# Patient Record
Sex: FEMALE | Race: OTHER | Employment: UNEMPLOYED | URBAN - METROPOLITAN AREA
[De-identification: names, ages, dates, MRNs, and addresses within clinical notes are randomized per-mention and may not be internally consistent; named-entity substitution may affect disease eponyms.]

---
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
## 2020-02-25 NOTE — ED NOTES
Pelvic exam done by Dr Violet Melchor with this RN at bedside. Swabs sent to lab with stickers.      Mariel Antony RN  07/15/18 [address] [facility]  07/15/18 1012 2

## 2020-03-19 ENCOUNTER — HOSPITAL ENCOUNTER (EMERGENCY)
Age: 20
Discharge: HOME | End: 2020-03-19
Payer: MEDICAID

## 2020-03-19 VITALS
RESPIRATION RATE: 16 BRPM | OXYGEN SATURATION: 99 % | HEART RATE: 84 BPM | TEMPERATURE: 98.1 F | DIASTOLIC BLOOD PRESSURE: 65 MMHG | WEIGHT: 131 LBS | SYSTOLIC BLOOD PRESSURE: 109 MMHG | HEIGHT: 62 IN | BODY MASS INDEX: 24.11 KG/M2

## 2020-03-19 LAB
BACTERIA: ABNORMAL /HPF
BILIRUBIN URINE: NEGATIVE
BLOOD, URINE: NEGATIVE
CLARITY: ABNORMAL
COLOR: YELLOW
EPITHELIAL CELLS, UA: ABNORMAL /HPF
GLUCOSE URINE: NEGATIVE MG/DL
KETONES, URINE: ABNORMAL MG/DL
LEUKOCYTE ESTERASE, URINE: ABNORMAL
MUCUS: PRESENT /LPF
NITRITE, URINE: NEGATIVE
PH UA: 6 (ref 5–9)
PROTEIN UA: NEGATIVE MG/DL
SPECIFIC GRAVITY UA: 1.03 (ref 1–1.03)
URINE REFLEX TO CULTURE: YES
UROBILINOGEN, URINE: 0.2 E.U./DL
WBC UA: ABNORMAL /HPF (ref 0–5)

## 2020-03-19 PROCEDURE — 99283 EMERGENCY DEPT VISIT LOW MDM: CPT

## 2020-03-19 PROCEDURE — 99281 EMR DPT VST MAYX REQ PHY/QHP: CPT

## 2020-03-19 PROCEDURE — 81001 URINALYSIS AUTO W/SCOPE: CPT

## 2020-03-19 PROCEDURE — 87086 URINE CULTURE/COLONY COUNT: CPT

## 2020-03-19 RX ORDER — CEPHALEXIN 500 MG/1
1000 CAPSULE ORAL 2 TIMES DAILY
Qty: 40 CAPSULE | Refills: 0 | Status: SHIPPED | OUTPATIENT
Start: 2020-03-19

## 2020-03-19 ASSESSMENT — ENCOUNTER SYMPTOMS
BACK PAIN: 0
SHORTNESS OF BREATH: 0
COUGH: 0
ABDOMINAL PAIN: 0

## 2020-03-19 NOTE — ED TRIAGE NOTES
Pt here for possible pregnancy. Pt states she took a test and it was negative but took another one and there was a faint positive line. Pt states she gets sick intermittently with breast tenderness.

## 2020-03-19 NOTE — ED PROVIDER NOTES
[facility] ED  eMERGENCY dEPARTMENT eNCOUnter      Pt Name: Lori Stoner  MRN: [id number]  [place] 2000  Date of evaluation: 3/19/2020  Provider: GURU Rodgers CNP      HISTORY OF PRESENT ILLNESS    Meenu Saucedo is a 20 y.o. female who presents to the Emergency Department because she thinks she may be pregnant. Patient states \"my boobs are sore\". LMP 2/26/2020. No pain. Denies nausea, vomiting or abdominal pain. REVIEW OF SYSTEMS       Review of Systems   Constitutional: Negative for activity change, appetite change and fever. HENT: Negative for congestion. Respiratory: Negative for cough and shortness of breath. Cardiovascular: Negative for chest pain. Gastrointestinal: Negative for abdominal pain. Genitourinary: Negative for difficulty urinating, dysuria, frequency, genital sores, hematuria, pelvic pain, urgency, vaginal bleeding, vaginal discharge and vaginal pain. Musculoskeletal: Negative for arthralgias and back pain. Skin: Negative for rash. All other systems reviewed and are negative. PAST MEDICAL HISTORY   History reviewed. No pertinent past medical history. SURGICAL HISTORY     History reviewed. No pertinent surgical history. CURRENT MEDICATIONS       Previous Medications    IBUPROFEN (IBU) 600 MG TABLET    Take 1 tablet by mouth every 6 hours as needed for Pain       ALLERGIES     Patient has no known allergies. FAMILY HISTORY     History reviewed. No pertinent family history.        SOCIAL HISTORY       Social History     Socioeconomic History    Marital status: Single     Spouse name: None    Number of children: None    Years of education: None    Highest education level: None   Occupational History    None   Social Needs    Financial resource strain: None    Food insecurity     Worry: None     Inability: None    Transportation needs     Medical: None     Non-medical: None   Tobacco Use    Smoking status: Light Tobacco Smoker     Packs/day: 0.50     Years: 2.00     Pack years: 1.00     Types: Cigarettes    Smokeless tobacco: Never Used    Tobacco comment: \"sometimes, not daily\"   Substance and Sexual Activity    Alcohol use: No    Drug use: No    Sexual activity: None   Lifestyle    Physical activity     Days per week: None     Minutes per session: None    Stress: None   Relationships    Social connections     Talks on phone: None     Gets together: None     Attends [employer] service: None     Active member of club or organization: None     Attends meetings of clubs or organizations: None     Relationship status: None    Intimate partner violence     Fear of current or ex partner: None     Emotionally abused: None     Physically abused: None     Forced sexual activity: None   Other Topics Concern    None   Social History Narrative    None       SCREENINGS      @FLOW([id number])@      PHYSICAL EXAM    (up to 7 for level 4, 8 or more for level 5)     ED Triage Vitals [03/19/20 1357]   BP Temp Temp src Pulse Resp SpO2 Height Weight   109/65 98.1 °F (36.7 °C) -- 84 16 99 % 5' 2\" (1.575 m) 131 lb (59.4 kg)       Physical Exam  Vitals signs and nursing note reviewed. Constitutional:       Appearance: She is well-developed. HENT:      Head: Normocephalic and atraumatic. Right Ear: External ear normal.      Left Ear: External ear normal.   Eyes:      Conjunctiva/sclera: Conjunctivae normal.      Pupils: Pupils are equal, round, and reactive to light. Neck:      Musculoskeletal: Normal range of motion and neck supple. Cardiovascular:      Rate and Rhythm: Normal rate and regular rhythm. Pulmonary:      Effort: Pulmonary effort is normal.      Breath sounds: Normal breath sounds. Abdominal:      General: Bowel sounds are normal. There is no distension. Palpations: Abdomen is soft. Tenderness: There is no abdominal tenderness. Musculoskeletal: Normal range of motion.    Skin:     General: Skin is warm and dry. Neurological:      Mental Status: She is alert and oriented to person, place, and time. Deep Tendon Reflexes: Reflexes are normal and symmetric. Psychiatric:         Judgment: Judgment normal.           All other labs were within normal range or not returned as of this dictation. EMERGENCY DEPARTMENT COURSE and DIFFERENTIALDIAGNOSIS/MDM:   Vitals:    Vitals:    03/19/20 1357   BP: 109/65   Pulse: 84   Resp: 16   Temp: 98.1 °F (36.7 °C)   SpO2: 99%   Weight: 131 lb (59.4 kg)   Height: 5' 2\" (1.575 m)          20 yr old female with UTI. Patient is to F/U With her PCP as needed. POC pregnancy test was reported as negative by JARON Ledesma. Patient is comfortable at discharge and verbalizes understanding. PROCEDURES:  Unless otherwise noted below, none     Procedures      FINAL IMPRESSION      1.  Acute UTI          DISPOSITION/PLAN   DISPOSITION Decision To Discharge 03/19/2020 03:17:05 PM          GURU Hope CNP (electronically signed)  Attending Emergency Physician     GURU Hope CNP  03/19/20 1905 [address], APRN - CNP  03/26/20 1000 [facility], APRN - CNP  04/04/20 1251

## 2020-03-21 LAB — URINE CULTURE, ROUTINE: NORMAL

## 2020-03-26 ASSESSMENT — ENCOUNTER SYMPTOMS
ABDOMINAL PAIN: 0
SHORTNESS OF BREATH: 0
BACK PAIN: 0
COUGH: 0

## 2021-06-16 ENCOUNTER — HOSPITAL ENCOUNTER (OUTPATIENT)
Dept: ULTRASOUND IMAGING | Age: 21
Discharge: HOME | End: 2021-06-18
Payer: MEDICAID

## 2021-06-16 DIAGNOSIS — Z34.90 PREGNANCY, UNSPECIFIED GESTATIONAL AGE: ICD-10-CM

## 2021-06-16 PROCEDURE — 76801 OB US < 14 WKS SINGLE FETUS: CPT

## 2021-11-17 NOTE — TELEPHONE ENCOUNTER
Please inform that pregnancy hormone is declining needs to repeat in 48 hrs from last test . DT 18-Nov-2021 01:00

## 2023-10-25 ENCOUNTER — HOSPITAL ENCOUNTER (EMERGENCY)
Facility: HOSPITAL | Age: 23
Discharge: HOME | End: 2023-10-25
Payer: COMMERCIAL

## 2023-10-25 VITALS
WEIGHT: 180 LBS | SYSTOLIC BLOOD PRESSURE: 126 MMHG | HEIGHT: 63 IN | DIASTOLIC BLOOD PRESSURE: 74 MMHG | RESPIRATION RATE: 16 BRPM | TEMPERATURE: 98.1 F | OXYGEN SATURATION: 99 % | HEART RATE: 74 BPM | BODY MASS INDEX: 31.89 KG/M2

## 2023-10-25 DIAGNOSIS — Z01.84 COVID-19 VIRUS ANTIBODY NEGATIVE: ICD-10-CM

## 2023-10-25 DIAGNOSIS — R05.9 COUGH, UNSPECIFIED TYPE: Primary | ICD-10-CM

## 2023-10-25 DIAGNOSIS — J02.9 ACUTE VIRAL PHARYNGITIS: ICD-10-CM

## 2023-10-25 LAB
FLUAV RNA RESP QL NAA+PROBE: NOT DETECTED
FLUBV RNA RESP QL NAA+PROBE: NOT DETECTED
RSV RNA RESP QL NAA+PROBE: NOT DETECTED
S PYO DNA THROAT QL NAA+PROBE: NOT DETECTED
SARS-COV-2 RNA RESP QL NAA+PROBE: NOT DETECTED

## 2023-10-25 PROCEDURE — 87637 SARSCOV2&INF A&B&RSV AMP PRB: CPT | Performed by: REGISTERED NURSE

## 2023-10-25 PROCEDURE — 99283 EMERGENCY DEPT VISIT LOW MDM: CPT

## 2023-10-25 PROCEDURE — 87651 STREP A DNA AMP PROBE: CPT | Performed by: REGISTERED NURSE

## 2023-10-25 RX ORDER — IBUPROFEN 600 MG/1
600 TABLET ORAL EVERY 6 HOURS PRN
Qty: 20 TABLET | Refills: 0 | Status: SHIPPED | OUTPATIENT
Start: 2023-10-25 | End: 2024-03-18 | Stop reason: WASHOUT

## 2023-10-25 RX ORDER — GUAIFENESIN 600 MG/1
1200 TABLET, EXTENDED RELEASE ORAL 2 TIMES DAILY
Qty: 120 TABLET | Refills: 0 | Status: SHIPPED | OUTPATIENT
Start: 2023-10-25 | End: 2024-03-18 | Stop reason: WASHOUT

## 2023-10-25 RX ORDER — FLUTICASONE PROPIONATE 50 MCG
1 SPRAY, SUSPENSION (ML) NASAL DAILY
Qty: 16 G | Refills: 0 | Status: SHIPPED | OUTPATIENT
Start: 2023-10-25 | End: 2023-11-24

## 2023-10-25 ASSESSMENT — COLUMBIA-SUICIDE SEVERITY RATING SCALE - C-SSRS
1. IN THE PAST MONTH, HAVE YOU WISHED YOU WERE DEAD OR WISHED YOU COULD GO TO SLEEP AND NOT WAKE UP?: NO
6. HAVE YOU EVER DONE ANYTHING, STARTED TO DO ANYTHING, OR PREPARED TO DO ANYTHING TO END YOUR LIFE?: NO
2. HAVE YOU ACTUALLY HAD ANY THOUGHTS OF KILLING YOURSELF?: NO

## 2023-10-25 ASSESSMENT — PAIN - FUNCTIONAL ASSESSMENT: PAIN_FUNCTIONAL_ASSESSMENT: 0-10

## 2023-10-25 ASSESSMENT — PAIN SCALES - GENERAL: PAINLEVEL_OUTOF10: 0 - NO PAIN

## 2023-10-25 NOTE — Clinical Note
Zeynep Her was seen and treated in our emergency department on 10/25/2023.  She may return to school on 10/26/2023.  Patient may return to school without restrictions.    If you have any questions or concerns, please don't hesitate to call.      Domenica Samuel, APRN-CNP

## 2023-10-25 NOTE — ED PROVIDER NOTES
HPI   Chief Complaint   Patient presents with    Flu Symptoms     Cough, congestion, R ear pain/pressure and sore throat for a few days        23-year-old female who denies significant past medical history presents emergency department today with a chief complaint of cough, congestion, and right ear pain.  Patient tells me that she has been experiencing the symptoms for approximately 3 days.  Patient tells me that she does have an occasional pain in her right ear, patient denies any taking any Tylenol or Motrin.  Patient reports that her cough is occasionally productive of yellow sputum.  Denies any throat pain but does endorse intermittent nausea.  Patient states that she has felt chilled over the last several days but has not actually taken her temperature.  Patient denies chest pain, shortness of breath, dizziness or lightheadedness, urinary symptoms, abdominal pain, headache, weakness, or constipation.      History provided by:  Patient   used: No                        No data recorded                Patient History   Past Medical History:   Diagnosis Date    Acute vaginitis 2020    Bacterial vaginitis    Personal history of other infectious and parasitic diseases 2021    History of candidal vulvovaginitis     Past Surgical History:   Procedure Laterality Date    OTHER SURGICAL HISTORY  2021     section     No family history on file.  Social History     Tobacco Use    Smoking status: Not on file    Smokeless tobacco: Not on file   Substance Use Topics    Alcohol use: Not on file    Drug use: Not on file       Physical Exam   ED Triage Vitals [10/25/23 1422]   Temp Heart Rate Resp BP   36.7 °C (98.1 °F) 72 18 154/87      SpO2 Temp Source Heart Rate Source Patient Position   98 % Temporal Monitor --      BP Location FiO2 (%)     -- --       Physical Exam  Constitutional:       Appearance: Normal appearance.   HENT:      Head: Normocephalic and atraumatic.      Right  Ear: Tympanic membrane normal.      Left Ear: Tympanic membrane normal.      Nose: Nose normal.      Mouth/Throat:      Mouth: Mucous membranes are moist.      Pharynx: Posterior oropharyngeal erythema present.   Cardiovascular:      Rate and Rhythm: Normal rate and regular rhythm.   Pulmonary:      Effort: Pulmonary effort is normal.      Breath sounds: Normal breath sounds.   Abdominal:      General: Abdomen is flat.   Musculoskeletal:         General: Normal range of motion.      Cervical back: Normal range of motion and neck supple.   Skin:     General: Skin is warm and dry.      Capillary Refill: Capillary refill takes less than 2 seconds.   Neurological:      General: No focal deficit present.      Mental Status: She is alert and oriented to person, place, and time.   Psychiatric:         Mood and Affect: Mood normal.         Behavior: Behavior normal.         ED Course & MDM   Diagnoses as of 10/25/23 1620   Cough, unspecified type   COVID-19 virus antibody negative   Acute viral pharyngitis       Medical Decision Making  23-year-old female who denies significant past medical history presents emergency department today with a chief complaint of cough, congestion, and right ear pain.  Patient tells me that she has been experiencing the symptoms for approximately 3 days.  Patient tells me that she does have an occasional pain in her right ear, patient denies any taking any Tylenol or Motrin.  Patient reports that her cough is occasionally productive of yellow sputum.  Denies any throat pain but does endorse intermittent nausea.  Patient states that she has felt chilled over the last several days but has not actually taken her temperature.  Patient denies chest pain, shortness of breath, dizziness or lightheadedness, urinary symptoms, abdominal pain, headache, weakness, or constipation.    Patient seen examined emergency department; patient is healthy and nontoxic in appearance does not appear to be in acute  distress.  Clinical exam significant for mild erythema in the posterior oropharynx.  Bilateral TMs are unremarkable.  No trismus or mastoid tenderness.  Lung sounds are clear bilaterally without any adventitious noises.  Heart regular rate and rhythm, no murmur.  Patient is afebrile.  Will obtain COVID, flu, strep, RSV swabs in the emergency department today.    Patient's flu, COVID, strep and RSV swabs are all negative today.  Results discussed with patient.  We discussed the importance of symptom management while at home.  Will discharge patient with prescriptions for Mucinex, ibuprofen, and Flonase.  All patient's questions and concerns were addressed prior to discharge.  Patient provided work excuse for today.  Patient discharged in stable condition.      Labs Reviewed   GROUP A STREPTOCOCCUS, PCR - Normal       Result Value    Group A Strep PCR Not Detected     SARS-COV-2 PCR, SYMPTOMATIC - Normal    Coronavirus 2019, PCR Not Detected      Narrative:     This assay has received FDA Emergency Use Authorization (EUA) and is only authorized for the duration of time that circumstances exist to justify the authorization of the emergency use of in vitro diagnostic tests for the detection of SARS-CoV-2 virus and/or diagnosis of COVID-19 infection under section 564(b)(1) of the Act, 21 U.S.C. 360bbb-3(b)(1). This assay is an in vitro diagnostic nucleic acid amplification test for the qualitative detection of SARS-CoV-2 from nasopharyngeal specimens and has been validated for use at [facility]. Negative results do not preclude COVID-19 infections and should not be used as the sole basis for diagnosis, treatment, or other management decisions.     INFLUENZA A AND B PCR - Normal    Flu A Result Not Detected      Flu B Result Not Detected      Narrative:     This assay is an in vitro diagnostic multiplex nucleic acid amplification test for the detection and discrimination of Influenza A & B from  nasopharyngeal specimens, and has been validated for use at [facility]. Negative results do not preclude Influenza A/B infections, and should not be used as the sole basis for diagnosis, treatment, or other management decisions. If Influenza A/B and RSV PCR results are negative, testing for Parainfluenza virus, Adenovirus and Metapneumovirus is routinely performed for [facility] [place] and intensive care inpatients, and is available on other patients by placing an add-on request.   RSV PCR - Normal    RSV PCR Not Detected      Narrative:     This assay is an FDA-cleared, in vitro diagnostic nucleic acid amplification test for the detection of RSV from nasopharyngeal specimens, and has been validated for use at [facility]. Negative results do not preclude RSV infections, and should not be used as the sole basis for diagnosis, treatment, or other management decisions. If Influenza A/B and RSV PCR results are negative, testing for Parainfluenza virus, Adenovirus and Metapneumovirus is routinely performed for pediatric oncology and intensive care inpatients at [facility], and is available on other patients by placing an add-on request.           No orders to display         Procedure  Procedures     EDD Izquierdo  10/25/23 1455       EDD Izquierdo  10/25/23 7513

## 2023-10-25 NOTE — Clinical Note
Zeynep Her was seen and treated in our emergency department on 10/25/2023.  She may return to work on 10/26/2023.       If you have any questions or concerns, please don't hesitate to call.      Domenica Samuel, APRN-CNP

## 2024-02-11 ENCOUNTER — APPOINTMENT (OUTPATIENT)
Dept: RADIOLOGY | Facility: HOSPITAL | Age: 24
End: 2024-02-11
Payer: COMMERCIAL

## 2024-02-11 ENCOUNTER — HOSPITAL ENCOUNTER (EMERGENCY)
Facility: HOSPITAL | Age: 24
Discharge: HOME | End: 2024-02-11
Payer: COMMERCIAL

## 2024-02-11 VITALS
OXYGEN SATURATION: 99 % | HEART RATE: 70 BPM | HEIGHT: 63 IN | SYSTOLIC BLOOD PRESSURE: 120 MMHG | RESPIRATION RATE: 20 BRPM | DIASTOLIC BLOOD PRESSURE: 60 MMHG | WEIGHT: 176 LBS | BODY MASS INDEX: 31.18 KG/M2 | TEMPERATURE: 97 F

## 2024-02-11 DIAGNOSIS — S32.2XXA CLOSED FRACTURE OF COCCYX, INITIAL ENCOUNTER (MULTI): Primary | ICD-10-CM

## 2024-02-11 PROCEDURE — 96372 THER/PROPH/DIAG INJ SC/IM: CPT

## 2024-02-11 PROCEDURE — 2500000004 HC RX 250 GENERAL PHARMACY W/ HCPCS (ALT 636 FOR OP/ED): Performed by: PHYSICIAN ASSISTANT

## 2024-02-11 PROCEDURE — 72131 CT LUMBAR SPINE W/O DYE: CPT | Performed by: STUDENT IN AN ORGANIZED HEALTH CARE EDUCATION/TRAINING PROGRAM

## 2024-02-11 PROCEDURE — 99284 EMERGENCY DEPT VISIT MOD MDM: CPT | Mod: 25

## 2024-02-11 PROCEDURE — 72131 CT LUMBAR SPINE W/O DYE: CPT

## 2024-02-11 RX ORDER — IBUPROFEN 600 MG/1
600 TABLET ORAL EVERY 6 HOURS PRN
Qty: 28 TABLET | Refills: 0 | Status: SHIPPED | OUTPATIENT
Start: 2024-02-11 | End: 2024-02-18

## 2024-02-11 RX ORDER — OXYCODONE AND ACETAMINOPHEN 5; 325 MG/1; MG/1
1 TABLET ORAL EVERY 8 HOURS PRN
Qty: 10 TABLET | Refills: 0 | Status: SHIPPED | OUTPATIENT
Start: 2024-02-11 | End: 2024-02-14

## 2024-02-11 RX ORDER — PREDNISONE 20 MG/1
60 TABLET ORAL ONCE
Status: COMPLETED | OUTPATIENT
Start: 2024-02-11 | End: 2024-02-11

## 2024-02-11 RX ORDER — NALOXONE HYDROCHLORIDE 4 MG/.1ML
4 SPRAY NASAL AS NEEDED
Qty: 2 EACH | Refills: 0 | Status: SHIPPED | OUTPATIENT
Start: 2024-02-11 | End: 2024-03-18 | Stop reason: WASHOUT

## 2024-02-11 RX ORDER — KETOROLAC TROMETHAMINE 30 MG/ML
15 INJECTION, SOLUTION INTRAMUSCULAR; INTRAVENOUS ONCE
Status: COMPLETED | OUTPATIENT
Start: 2024-02-11 | End: 2024-02-11

## 2024-02-11 RX ADMIN — KETOROLAC TROMETHAMINE 15 MG: 30 INJECTION INTRAMUSCULAR; INTRAVENOUS at 19:53

## 2024-02-11 RX ADMIN — PREDNISONE 60 MG: 20 TABLET ORAL at 19:52

## 2024-02-11 ASSESSMENT — PAIN DESCRIPTION - ORIENTATION
ORIENTATION: MID
ORIENTATION: MID

## 2024-02-11 ASSESSMENT — PAIN DESCRIPTION - FREQUENCY
FREQUENCY: CONSTANT/CONTINUOUS
FREQUENCY: INTERMITTENT

## 2024-02-11 ASSESSMENT — PAIN DESCRIPTION - PAIN TYPE
TYPE: ACUTE PAIN
TYPE: ACUTE PAIN

## 2024-02-11 ASSESSMENT — PAIN SCALES - GENERAL
PAINLEVEL_OUTOF10: 10 - WORST POSSIBLE PAIN
PAINLEVEL_OUTOF10: 3
PAINLEVEL_OUTOF10: 10 - WORST POSSIBLE PAIN

## 2024-02-11 ASSESSMENT — COLUMBIA-SUICIDE SEVERITY RATING SCALE - C-SSRS
6. HAVE YOU EVER DONE ANYTHING, STARTED TO DO ANYTHING, OR PREPARED TO DO ANYTHING TO END YOUR LIFE?: NO
1. IN THE PAST MONTH, HAVE YOU WISHED YOU WERE DEAD OR WISHED YOU COULD GO TO SLEEP AND NOT WAKE UP?: NO
2. HAVE YOU ACTUALLY HAD ANY THOUGHTS OF KILLING YOURSELF?: NO

## 2024-02-11 ASSESSMENT — LIFESTYLE VARIABLES
EVER FELT BAD OR GUILTY ABOUT YOUR DRINKING: NO
HAVE YOU EVER FELT YOU SHOULD CUT DOWN ON YOUR DRINKING: NO
EVER HAD A DRINK FIRST THING IN THE MORNING TO STEADY YOUR NERVES TO GET RID OF A HANGOVER: NO
HAVE PEOPLE ANNOYED YOU BY CRITICIZING YOUR DRINKING: NO

## 2024-02-11 ASSESSMENT — PAIN DESCRIPTION - LOCATION
LOCATION: BUTTOCKS
LOCATION: BUTTOCKS

## 2024-02-11 ASSESSMENT — PAIN - FUNCTIONAL ASSESSMENT
PAIN_FUNCTIONAL_ASSESSMENT: 0-10

## 2024-02-11 ASSESSMENT — PAIN DESCRIPTION - ONSET: ONSET: ONGOING

## 2024-02-11 ASSESSMENT — PAIN DESCRIPTION - DESCRIPTORS
DESCRIPTORS: ACHING
DESCRIPTORS: SHOOTING

## 2024-02-11 ASSESSMENT — PAIN DESCRIPTION - PROGRESSION: CLINICAL_PROGRESSION: NOT CHANGED

## 2024-02-11 NOTE — ED TRIAGE NOTES
Pt to ED for tailbone pain r/t fall on steps yesterday.  Pt states she landed on her butt.  Pain 10/10.  Respirations even and unlabored.  No acute distress noted at this time.  Denies CP and SOB.  A&Ox4.  VSS.

## 2024-02-11 NOTE — Clinical Note
Zeynep Her was seen and treated in our emergency department on 2/11/2024.  She may return to work on 02/14/2024.       If you have any questions or concerns, please don't hesitate to call.      Alton Silva PA-C

## 2024-02-12 NOTE — ED PROVIDER NOTES
HPI   Chief Complaint   Patient presents with    Tailbone Pain     Fell yesterday on stairs, landed on tailbone, 10/10 pain       A 23-year-old female patient comes in the emergency department today with complaints of sacral and low back pain.  She describes that she slipped and fell on some steps landing on her buttocks.  States she been having a lot of pain since that time.  Rates the pain a 10 out of 10 on the pain scale.  States took some Tylenol but did not help.  Denies hitting her head or loss consciousness.  Denies any saddle anesthesia, loss bowel control, urinary retention.  This purpose comes in the emergency department today for further evaluation.  Otherwise no other complaints this present time.                          [place] Coma Scale Score: 15                     Patient History   Past Medical History:   Diagnosis Date    Acute vaginitis 2020    Bacterial vaginitis    Personal history of other infectious and parasitic diseases 2021    History of candidal vulvovaginitis     Past Surgical History:   Procedure Laterality Date    OTHER SURGICAL HISTORY  2021     section     No family history on file.  Social History     Tobacco Use    Smoking status: Not on file    Smokeless tobacco: Not on file   Substance Use Topics    Alcohol use: Not on file    Drug use: Not on file       Physical Exam   ED Triage Vitals [24 1833]   Temperature Heart Rate Respirations BP   36.1 °C (97 °F) 73 18 114/66      Pulse Ox Temp src Heart Rate Source Patient Position   99 % -- -- --      BP Location FiO2 (%)     -- --       Physical Exam    ED Course & MDM   Diagnoses as of 24   Closed fracture of coccyx, initial encounter (CMS/[facility])       Medical Decision Making  A 23-year-old female patient comes in the emergency department today with complaints of sacral and low back pain.  She describes that she slipped and fell on some steps landing on her buttocks.  States she been having a  lot of pain since that time.  Rates the pain a 10 out of 10 on the pain scale.  States took some Tylenol but did not help.  Denies hitting her head or loss consciousness.  Denies any saddle anesthesia, loss bowel control, urinary retention.  This purpose comes in the emergency department today for further evaluation.  Otherwise no other complaints this present time.    CT of the lumbar spine ordered to rule out any acute fracture or abnormality.  IM Toradol p.o. prednisone ordered for the patient    Patient was found to have a coccyx fracture with mild comminution.  No other abnormalities on CT study.  Will discharge patient home with p.o. pain medication as well as follow-up with her PCP.  Patient agrees with this plan expressed full verbal understanding.  Questions answered.    Historians patient    Diagnosis: Acute coccyx fracture    Labs Reviewed - No data to display     CT lumbar spine wo IV contrast   Final Result   Acute nondisplaced vertical fracture involving the coccyx with mild   comminution. No acute lumbar or sacral fracture.        Bilateral ovarian cysts measuring 4.2 cm on the right and at least   4.7 cm on the left. Given they are new from 01/29/2019 and given   their size, recommend nonemergent ultrasound evaluation. YELLOW   ALERT: An incidental finding alert was sent per protocol.        MACRO:   Critical Finding:  See findings. Notification was initiated on   2/11/2024 at 7:36 pm by  Dio Marr.  (**-YCF-**) Instructions:        Signed by: Dio Marr 2/11/2024 7:36 PM   Dictation workstation:   [id number]            Procedure  Procedures     Alton Silva PA-C  02/11/24 1959

## 2024-02-20 ENCOUNTER — APPOINTMENT (OUTPATIENT)
Dept: OBSTETRICS AND GYNECOLOGY | Facility: CLINIC | Age: 24
End: 2024-02-20
Payer: COMMERCIAL

## 2024-03-18 ENCOUNTER — OFFICE VISIT (OUTPATIENT)
Dept: OBSTETRICS AND GYNECOLOGY | Facility: CLINIC | Age: 24
End: 2024-03-18
Payer: COMMERCIAL

## 2024-03-18 VITALS
WEIGHT: 177.4 LBS | HEIGHT: 63 IN | SYSTOLIC BLOOD PRESSURE: 102 MMHG | BODY MASS INDEX: 31.43 KG/M2 | DIASTOLIC BLOOD PRESSURE: 68 MMHG

## 2024-03-18 DIAGNOSIS — N83.201 CYSTS OF BOTH OVARIES: Primary | ICD-10-CM

## 2024-03-18 DIAGNOSIS — R10.2 PELVIC PAIN: ICD-10-CM

## 2024-03-18 DIAGNOSIS — N83.202 CYSTS OF BOTH OVARIES: Primary | ICD-10-CM

## 2024-03-18 DIAGNOSIS — Z97.5 NEXPLANON IN PLACE: ICD-10-CM

## 2024-03-18 PROCEDURE — 99214 OFFICE O/P EST MOD 30 MIN: CPT | Performed by: OBSTETRICS & GYNECOLOGY

## 2024-03-18 RX ORDER — ETONOGESTREL 68 MG/1
1 IMPLANT SUBCUTANEOUS ONCE
COMMUNITY

## 2024-03-18 NOTE — PROGRESS NOTES
"GYNECOLOGY PROGRESS NOTE    CC:     Chief Complaint   Patient presents with    Ovarian Cyst     Est patient - here to discuss the ovarian cyst seen on the CT on 24  Patient no loner taking Mucinex, Narcan or Ibuprofen        HPI:  Zeynep Her is here for ER follow up for ovarian cyst.    Patient in ER 24 and had CT of L-spine after a fall.  CT showed B/l ovarian cysts.  BC=Nexplanon (placed 2021).  Had some mild pain on RT side since then for a couple of days, resolved on own, pain-free at this time.            ROS:  GYN - see HPI        HISTORY:  Past Surgical History:   Procedure Laterality Date     SECTION, LOW TRANSVERSE       Social History     Tobacco Use    Smoking status: Every Day     Packs/day: 0.10     Years: 5.00     Additional pack years: 0.00     Total pack years: 0.50     Types: Cigarettes, Cigars    Smokeless tobacco: Never   Vaping Use    Vaping Use: Never used   Substance Use Topics    Drug use: Never          PHYSICAL EXAM:  /68 (BP Location: Left arm, Patient Position: Sitting)   Ht 1.6 m (5' 3\")   Wt 80.5 kg (177 lb 6.4 oz)   LMP 2024   BMI 31.42 kg/m²   GEN:  A&O, NAD  ABD:  soft, no palpable masses, non-distended, NT/ND, obese, no rebound or guarding  URO:  no suprapubic TTP  PSYCH:  normal affect, non-anxious    === 24 ===    CT LUMBAR SPINE WO IV CONTRAST    - Impression -  Acute nondisplaced vertical fracture involving the coccyx with mild  comminution. No acute lumbar or sacral fracture.    Bilateral ovarian cysts measuring 4.2 cm on the right and at least  4.7 cm on the left. Given they are new from 2019 and given  their size, recommend nonemergent ultrasound evaluation. YELLOW  ALERT: An incidental finding alert was sent per protocol.    *I personally reviewed CT results and there are bilateral simple ovarian cyst measuring 4.2 cm on the right and 4.7 cm on the left, no free fluid      IMPRESSION/PLAN:    Problem List Items " Addressed This Visit    None  Visit Diagnoses       Cysts of both ovaries    -  Primary    Pelvic pain        Relevant Orders    US PELVIS TRANSABDOMINAL WITH TRANSVAGINAL    Nexplanon in place              Ovarian Cyst:  Diagnosis of incidental B/L ovarian cysts (4.7 cm LT, 4.2 cm RT) on lumbar X-ray (workup in ER after a fall) reviewed.  Benign nature of ovarian cysts and normal course of resolution over 1-2 cycles discussed.  Indications for surgical intervention (severe pain, size > 5 cm, torsion, abnormal appearance of cyst on US) discussed. Follow up US is indicated--ordered.    Pelvic pain:  Resolved.  Pain/torsion precautions reviewed, given size of ovarian cysts nears 5 cm.    Nexplanon:  Placed 11/2021, expires 11/2024.      Follow-up in ~1 month for virtual visit for ultrasound results.    F/U 6/2024 with CITLALLI Villalobos for annual exam with repeat pap smear screening.      Isaiah Parker, DO

## 2024-03-22 ENCOUNTER — APPOINTMENT (OUTPATIENT)
Dept: RADIOLOGY | Facility: HOSPITAL | Age: 24
End: 2024-03-22
Payer: COMMERCIAL

## 2024-05-29 ENCOUNTER — HOSPITAL ENCOUNTER (OUTPATIENT)
Dept: RADIOLOGY | Facility: HOSPITAL | Age: 24
Discharge: HOME | End: 2024-05-29
Payer: COMMERCIAL

## 2024-05-29 DIAGNOSIS — R10.2 PELVIC PAIN: ICD-10-CM

## 2024-05-29 PROCEDURE — 76830 TRANSVAGINAL US NON-OB: CPT | Performed by: RADIOLOGY

## 2024-05-29 PROCEDURE — 76856 US EXAM PELVIC COMPLETE: CPT

## 2024-05-29 PROCEDURE — 76856 US EXAM PELVIC COMPLETE: CPT | Performed by: RADIOLOGY

## 2024-05-31 ENCOUNTER — TELEPHONE (OUTPATIENT)
Dept: OBSTETRICS AND GYNECOLOGY | Facility: CLINIC | Age: 24
End: 2024-05-31
Payer: COMMERCIAL

## 2024-05-31 DIAGNOSIS — N83.201 CYSTS OF BOTH OVARIES: ICD-10-CM

## 2024-05-31 DIAGNOSIS — R10.2 PELVIC PAIN: ICD-10-CM

## 2024-05-31 DIAGNOSIS — N83.202 CYSTS OF BOTH OVARIES: ICD-10-CM

## 2024-05-31 NOTE — TELEPHONE ENCOUNTER
Spoke with patient and made her aware of the results and recommendation.  New US order placed to do in 3 months.  Reviewed and approved by MIGUEL DAMICO on 5/31/24 at 1:34 PM.

## 2024-05-31 NOTE — TELEPHONE ENCOUNTER
----- Message from Isaiah Parker DO sent at 5/31/2024  9:58 AM EDT -----  Regarding: FW: US results   Call patient and let her know that the ovarian cyst are both smaller--by about half--compared to the last imaging. I'd like to repeat the US imaging one more time in three months.  ----- Message -----  From: Isaiah Parker DO  Sent: 5/31/2024   8:42 AM EDT  To: Isaiah Parker DO  Subject: US results

## 2024-12-06 ENCOUNTER — APPOINTMENT (OUTPATIENT)
Dept: OBSTETRICS AND GYNECOLOGY | Facility: CLINIC | Age: 24
End: 2024-12-06
Payer: COMMERCIAL

## 2024-12-24 ENCOUNTER — APPOINTMENT (OUTPATIENT)
Dept: OBSTETRICS AND GYNECOLOGY | Facility: CLINIC | Age: 24
End: 2024-12-24
Payer: COMMERCIAL

## 2025-02-05 ENCOUNTER — APPOINTMENT (OUTPATIENT)
Dept: OBSTETRICS AND GYNECOLOGY | Facility: CLINIC | Age: 25
End: 2025-02-05
Payer: COMMERCIAL

## 2025-02-11 ENCOUNTER — APPOINTMENT (OUTPATIENT)
Dept: OBSTETRICS AND GYNECOLOGY | Facility: CLINIC | Age: 25
End: 2025-02-11
Payer: COMMERCIAL

## 2025-02-11 VITALS — HEIGHT: 63 IN | WEIGHT: 173.6 LBS | BODY MASS INDEX: 30.76 KG/M2

## 2025-02-11 DIAGNOSIS — Z30.46 NEXPLANON REMOVAL: Primary | ICD-10-CM

## 2025-02-11 DIAGNOSIS — Z30.011 ENCOUNTER FOR INITIAL PRESCRIPTION OF CONTRACEPTIVE PILLS: ICD-10-CM

## 2025-02-11 PROCEDURE — 11982 REMOVE DRUG IMPLANT DEVICE: CPT | Performed by: ADVANCED PRACTICE MIDWIFE

## 2025-02-11 RX ORDER — DESOGESTREL AND ETHINYL ESTRADIOL 0.15-0.03
1 KIT ORAL DAILY
Qty: 28 TABLET | Refills: 12 | Status: SHIPPED | OUTPATIENT
Start: 2025-02-11 | End: 2026-02-11

## 2025-02-11 RX ORDER — LIDOCAINE HYDROCHLORIDE 10 MG/ML
3 INJECTION, SOLUTION INFILTRATION; PERINEURAL ONCE
Status: COMPLETED | OUTPATIENT
Start: 2025-02-11 | End: 2025-02-11

## 2025-02-11 RX ADMIN — LIDOCAINE HYDROCHLORIDE 3 ML: 10 INJECTION, SOLUTION INFILTRATION; PERINEURAL at 13:47

## 2025-02-11 ASSESSMENT — PAIN SCALES - GENERAL: PAINLEVEL_OUTOF10: 0 - NO PAIN

## 2025-02-11 NOTE — PROGRESS NOTES
NEXPLANON REMOVAL INSERTION NOTE    The patient's pre-procedure questions were answered and a written informed consent form was signed.   The proximal and distal ends of the Nexplanon device were identified in the patient's upper extremity, they were then marked with a pen.  The site was cleansed with ChloraPrep.  The site was anesthetized with 3 mL of 1% marcaine just below the distal tip with the aid of compression of the proximal tip to elevate the distal tip of the Nexplanon device.  Once this was done the planned incision site was checked for sensation and found to be adequately anesthetized.  A 3 mm incision was made over the distal tip of the Nexplanon alfonso.  After this was done the proximal end of the Nexplanon alfonso was compressed to bring the distal tip to the opening.  Any present scar tissue superior to the tip of the Nexplanon alfonso tip was lysed sharply with a scalpel.  The device was then able to be extruded through the skin incision.  The Nexplanon alfonso was then grasped with a hemostat and it was removed intact without difficulty.  Upon removal the Nexplanon device was inspected and it was fully intact.  The site was dressed with a Band-Aid and a sterile gauze to prevent bruising.  There were no complications.  Blood loss was minimal. Rx sent in for OCPS to start today.       F/U PRN or when next preventative GYN examination due.      VIOLA Tovar-CITLALLI

## 2025-02-18 ENCOUNTER — APPOINTMENT (OUTPATIENT)
Dept: OBSTETRICS AND GYNECOLOGY | Facility: CLINIC | Age: 25
End: 2025-02-18
Payer: COMMERCIAL

## 2025-02-18 VITALS
SYSTOLIC BLOOD PRESSURE: 110 MMHG | HEIGHT: 63 IN | DIASTOLIC BLOOD PRESSURE: 72 MMHG | WEIGHT: 174.8 LBS | BODY MASS INDEX: 30.97 KG/M2

## 2025-02-18 DIAGNOSIS — Z01.419 ENCNTR FOR GYN EXAM (GENERAL) (ROUTINE) W/O ABN FINDINGS: Primary | ICD-10-CM

## 2025-02-18 DIAGNOSIS — Z12.4 SCREENING FOR CERVICAL CANCER: ICD-10-CM

## 2025-02-18 PROCEDURE — 87491 CHLMYD TRACH DNA AMP PROBE: CPT

## 2025-02-18 PROCEDURE — 87661 TRICHOMONAS VAGINALIS AMPLIF: CPT

## 2025-02-18 PROCEDURE — 3008F BODY MASS INDEX DOCD: CPT | Performed by: ADVANCED PRACTICE MIDWIFE

## 2025-02-18 PROCEDURE — 99395 PREV VISIT EST AGE 18-39: CPT | Performed by: ADVANCED PRACTICE MIDWIFE

## 2025-02-18 PROCEDURE — 87591 N.GONORRHOEAE DNA AMP PROB: CPT

## 2025-02-18 NOTE — PROGRESS NOTES
"GYNECOLOGY PROGRESS NOTE        CC:  see below. No concerns at this  time. Nexplanon removed 2/11/25 and started OCPS the same day. No issues at this time with them. Has refills. No breast issues.   Chief Complaint   Patient presents with    Annual Exam     Est pt annual visit.   Pap 6/2021 neg        HPI:  Zeynep Her is here for a routine GYN examination.  No GYN c/o, no AUB.        Contraception:  OCPS  Pregnancy plans:  none  Gardasil:  no          ROS:  GI - no blood in BMs  URO - no hematuria  GYN - no AUB or vaginal discharge  PSYCH - mood OK        PHYSICAL EXAM:  /72 (BP Location: Left arm, Patient Position: Sitting, BP Cuff Size: Adult)   Ht 1.6 m (5' 3\")   Wt 79.3 kg (174 lb 12.8 oz)   BMI 30.96 kg/m²   GEN:  A&O, NAD  URO:  normal urethra, no bladder TTP  GYN:  normal vulva and perineum w/o lesions or ulcers, normal vagina without discharge or lesions, normal cervix without lesions or discharge or CMT, uterus NT/NE, adnexa mobile and NT/NE  PSYCH:  normal affect, non-anxious      IMPRESSION/PLAN:  A: normal gyn exam. No issues at this time with OCPS  Plan: 1. Pap and if wnl then repeat in 3 years. 2. Has refills for OCPS until 1/2026.   Problem List Items Addressed This Visit    None  Visit Diagnoses       Screening for cervical cancer                 ASCCP pap smear screening guidelines reviewed with the patient.  Annual STD screening done per CDC guidelines if applicable (if < age 25).      VIOLA Tovar-CITLALLI      "

## 2025-02-20 LAB
C TRACH RRNA SPEC QL NAA+PROBE: NEGATIVE
N GONORRHOEA DNA SPEC QL PROBE+SIG AMP: NEGATIVE
T VAGINALIS RRNA SPEC QL NAA+PROBE: NEGATIVE

## 2025-03-06 LAB
CYTOLOGY CMNT CVX/VAG CYTO-IMP: NORMAL
LAB AP HPV GENOTYPE QUESTION: YES
LAB AP HPV HR: NORMAL
LAB AP PAP ADDITIONAL TESTS: NORMAL
LABORATORY COMMENT REPORT: NORMAL
PATH REPORT.TOTAL CANCER: NORMAL

## 2025-03-07 ENCOUNTER — TELEPHONE (OUTPATIENT)
Dept: OBSTETRICS AND GYNECOLOGY | Facility: CLINIC | Age: 25
End: 2025-03-07
Payer: COMMERCIAL

## 2025-03-07 DIAGNOSIS — B37.9 CANDIDIASIS: ICD-10-CM

## 2025-03-07 DIAGNOSIS — B96.89 GARDNERELLA VAGINITIS: Primary | ICD-10-CM

## 2025-03-07 DIAGNOSIS — N76.0 GARDNERELLA VAGINITIS: Primary | ICD-10-CM

## 2025-03-07 RX ORDER — TERCONAZOLE 8 MG/G
1 CREAM VAGINAL NIGHTLY
Qty: 20 G | Refills: 0 | Status: SHIPPED | OUTPATIENT
Start: 2025-03-07 | End: 2025-03-10

## 2025-03-07 RX ORDER — METRONIDAZOLE 500 MG/1
500 TABLET ORAL 2 TIMES DAILY
Qty: 14 TABLET | Refills: 0 | Status: SHIPPED | OUTPATIENT
Start: 2025-03-07 | End: 2025-03-14

## 2025-03-07 NOTE — TELEPHONE ENCOUNTER
----- Message from Madhavi Villalobos sent at 3/7/2025  2:05 PM EST -----  Regarding: results on pap  Her pap was normal but it showed she has a yeast and a bacterial infection. I sent in Terazol for her and then oral Flagyl for her to use if she is having symptoms.  Madhavi  ----- Message -----  From: Lab, Background User  Sent: 2/20/2025   5:26 PM EST  To: OBED Tovar

## 2025-03-16 ENCOUNTER — APPOINTMENT (OUTPATIENT)
Dept: RADIOLOGY | Facility: HOSPITAL | Age: 25
End: 2025-03-16
Payer: COMMERCIAL

## 2025-03-16 ENCOUNTER — HOSPITAL ENCOUNTER (EMERGENCY)
Facility: HOSPITAL | Age: 25
Discharge: HOME | End: 2025-03-16
Payer: COMMERCIAL

## 2025-03-16 VITALS
HEART RATE: 65 BPM | RESPIRATION RATE: 17 BRPM | HEIGHT: 63 IN | DIASTOLIC BLOOD PRESSURE: 72 MMHG | BODY MASS INDEX: 30.83 KG/M2 | WEIGHT: 174 LBS | SYSTOLIC BLOOD PRESSURE: 128 MMHG | OXYGEN SATURATION: 98 % | TEMPERATURE: 97.3 F

## 2025-03-16 DIAGNOSIS — R05.1 ACUTE COUGH: Primary | ICD-10-CM

## 2025-03-16 LAB
FLUAV RNA RESP QL NAA+PROBE: NOT DETECTED
FLUBV RNA RESP QL NAA+PROBE: NOT DETECTED
RSV RNA RESP QL NAA+PROBE: NOT DETECTED
SARS-COV-2 RNA RESP QL NAA+PROBE: NOT DETECTED

## 2025-03-16 PROCEDURE — 71046 X-RAY EXAM CHEST 2 VIEWS: CPT

## 2025-03-16 PROCEDURE — 99284 EMERGENCY DEPT VISIT MOD MDM: CPT | Mod: 25

## 2025-03-16 PROCEDURE — 71046 X-RAY EXAM CHEST 2 VIEWS: CPT | Mod: FOREIGN READ | Performed by: RADIOLOGY

## 2025-03-16 PROCEDURE — 87637 SARSCOV2&INF A&B&RSV AMP PRB: CPT | Performed by: PHYSICIAN ASSISTANT

## 2025-03-16 RX ORDER — PREDNISONE 20 MG/1
40 TABLET ORAL DAILY
Qty: 14 TABLET | Refills: 0 | Status: SHIPPED | OUTPATIENT
Start: 2025-03-16 | End: 2025-03-23

## 2025-03-16 RX ORDER — ALBUTEROL SULFATE 90 UG/1
2 INHALANT RESPIRATORY (INHALATION) EVERY 6 HOURS PRN
Qty: 8 G | Refills: 0 | Status: SHIPPED | OUTPATIENT
Start: 2025-03-16

## 2025-03-16 RX ORDER — BENZONATATE 100 MG/1
200 CAPSULE ORAL 3 TIMES DAILY PRN
Qty: 20 CAPSULE | Refills: 0 | Status: SHIPPED | OUTPATIENT
Start: 2025-03-16

## 2025-03-16 ASSESSMENT — LIFESTYLE VARIABLES
HAVE YOU EVER FELT YOU SHOULD CUT DOWN ON YOUR DRINKING: NO
EVER HAD A DRINK FIRST THING IN THE MORNING TO STEADY YOUR NERVES TO GET RID OF A HANGOVER: NO
HAVE PEOPLE ANNOYED YOU BY CRITICIZING YOUR DRINKING: NO
EVER FELT BAD OR GUILTY ABOUT YOUR DRINKING: NO
TOTAL SCORE: 0

## 2025-03-16 ASSESSMENT — PAIN SCALES - GENERAL: PAINLEVEL_OUTOF10: 0 - NO PAIN

## 2025-03-16 ASSESSMENT — PAIN - FUNCTIONAL ASSESSMENT: PAIN_FUNCTIONAL_ASSESSMENT: 0-10

## 2025-03-16 ASSESSMENT — COLUMBIA-SUICIDE SEVERITY RATING SCALE - C-SSRS
2. HAVE YOU ACTUALLY HAD ANY THOUGHTS OF KILLING YOURSELF?: NO
1. IN THE PAST MONTH, HAVE YOU WISHED YOU WERE DEAD OR WISHED YOU COULD GO TO SLEEP AND NOT WAKE UP?: NO
6. HAVE YOU EVER DONE ANYTHING, STARTED TO DO ANYTHING, OR PREPARED TO DO ANYTHING TO END YOUR LIFE?: NO

## 2025-03-16 ASSESSMENT — PAIN DESCRIPTION - PROGRESSION: CLINICAL_PROGRESSION: NOT CHANGED

## 2025-03-16 NOTE — ED PROVIDER NOTES
HPI   Chief Complaint   Patient presents with    Cough       This is a 24-year-old female presenting for evaluation of intermittently productive cough of clear sputum for a week.  Initially had loose stool and nausea and sore throat which have since resolved.  She smokes Black and milds.  Denies chest pain, shortness of breath, wheezing, vomiting.      History provided by:  Patient   used: No            Patient History   No past medical history on file.  Past Surgical History:   Procedure Laterality Date     SECTION, LOW TRANSVERSE       No family history on file.  Social History     Tobacco Use    Smoking status: Every Day     Current packs/day: 0.10     Average packs/day: 0.1 packs/day for 5.0 years (0.5 ttl pk-yrs)     Types: Cigarettes, Cigars    Smokeless tobacco: Never   Vaping Use    Vaping status: Never Used   Substance Use Topics    Alcohol use: Not on file    Drug use: Never       Physical Exam   ED Triage Vitals [25 1513]   Temperature Heart Rate Respirations BP   36.3 °C (97.3 °F) 65 17 128/72      Pulse Ox Temp Source Heart Rate Source Patient Position   98 % Temporal Monitor Sitting      BP Location FiO2 (%)     Right arm --       Physical Exam    General: Vitals noted, no distress. Afebrile. Normal phonation. No stridor or trismus.   EENT: Posterior oropharynx patent, no exudates or tonsillar swelling. Uvula midline  Neck: No meningismus.  No lymphadenopathy.  Cardiac: Regular rate and rhythm. No murmur.  Pulmonary: Lungs clear bilaterally with good aeration. No adventitious breath sounds.    ED Course & MDM   Diagnoses as of 25 1710   Acute cough                 No data recorded                                 Medical Decision Making  DDx: Flu, COVID, RSV, pneumonia, viral illness, postviral cough    Patient appears well.  Nontoxic.  Stable hemodynamically.  Unremarkable ENT exam.  Lungs and chest are clear to auscultation.  Soft nontender abdomen.  Chest x-ray  shows no acute cardiopulmonary process.  Negative viral swabs.  Will give prescription for inhaler prednisone and Tessalon Perles.  Advised supportive measures, push fluids, OTC analgesics/antipyretics.  Instructed to return to the nearest ED if any concerns or new or worsening symptoms. Patient verbalized understanding and agreement with plan. Discharged in stable condition.      Disclaimer: This note was dictated using speech recognition software. An attempt at proofreading was made to minimize errors. Minor errors in transcription may be present. Please call if questions.    Amount and/or Complexity of Data Reviewed  Labs: ordered.  Radiology: ordered.        Procedure  Procedures     Tarun Grande PA-C  03/16/25 [id number]

## 2025-06-17 ENCOUNTER — HOSPITAL ENCOUNTER (EMERGENCY)
Facility: HOSPITAL | Age: 25
Discharge: HOME | End: 2025-06-17
Payer: COMMERCIAL

## 2025-06-17 ENCOUNTER — APPOINTMENT (OUTPATIENT)
Dept: RADIOLOGY | Facility: HOSPITAL | Age: 25
End: 2025-06-17
Payer: COMMERCIAL

## 2025-06-17 VITALS
RESPIRATION RATE: 20 BRPM | WEIGHT: 175 LBS | TEMPERATURE: 98.1 F | HEART RATE: 87 BPM | DIASTOLIC BLOOD PRESSURE: 72 MMHG | HEIGHT: 62 IN | OXYGEN SATURATION: 97 % | BODY MASS INDEX: 32.2 KG/M2 | SYSTOLIC BLOOD PRESSURE: 130 MMHG

## 2025-06-17 DIAGNOSIS — J06.9 UPPER RESPIRATORY TRACT INFECTION, UNSPECIFIED TYPE: Primary | ICD-10-CM

## 2025-06-17 LAB
FLUAV RNA RESP QL NAA+PROBE: NOT DETECTED
FLUBV RNA RESP QL NAA+PROBE: NOT DETECTED
SARS-COV-2 RNA RESP QL NAA+PROBE: NOT DETECTED

## 2025-06-17 PROCEDURE — 71046 X-RAY EXAM CHEST 2 VIEWS: CPT | Mod: FOREIGN READ | Performed by: RADIOLOGY

## 2025-06-17 PROCEDURE — 71046 X-RAY EXAM CHEST 2 VIEWS: CPT

## 2025-06-17 PROCEDURE — 87636 SARSCOV2 & INF A&B AMP PRB: CPT | Performed by: PHYSICIAN ASSISTANT

## 2025-06-17 PROCEDURE — 99284 EMERGENCY DEPT VISIT MOD MDM: CPT | Mod: 25

## 2025-06-17 RX ORDER — BENZONATATE 100 MG/1
100 CAPSULE ORAL EVERY 8 HOURS
Qty: 15 CAPSULE | Refills: 0 | Status: SHIPPED | OUTPATIENT
Start: 2025-06-17 | End: 2025-06-22

## 2025-06-17 ASSESSMENT — LIFESTYLE VARIABLES
HAVE YOU EVER FELT YOU SHOULD CUT DOWN ON YOUR DRINKING: NO
TOTAL SCORE: 0
HAVE PEOPLE ANNOYED YOU BY CRITICIZING YOUR DRINKING: NO
EVER HAD A DRINK FIRST THING IN THE MORNING TO STEADY YOUR NERVES TO GET RID OF A HANGOVER: NO
EVER FELT BAD OR GUILTY ABOUT YOUR DRINKING: NO

## 2025-06-17 ASSESSMENT — PAIN SCALES - GENERAL: PAINLEVEL_OUTOF10: 0 - NO PAIN

## 2025-06-17 ASSESSMENT — PAIN - FUNCTIONAL ASSESSMENT: PAIN_FUNCTIONAL_ASSESSMENT: 0-10

## 2025-06-17 NOTE — ED PROVIDER NOTES
HPI   Chief Complaint   Patient presents with    Cough     Cough and congestion       25-year-old female, otherwise healthy presenting to the ER today with sinus congestion runny nose sore throat and a cough that started yesterday morning.  Patient states since then her sore throat has resolved but she still is having a cough productive of clear sputum, congestion and runny nose.  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  She has not had a fever.  She has no concerns for pregnancy.  She has not had any recent travel, surgery or immobilization and has not had leg pain or swelling.  She does smoke but denies any alcohol use or drug use.  No further complaints.      History provided by:  Patient          Patient History   Medical History[1]  Surgical History[2]  Family History[3]  Social History[4]    Physical Exam   ED Triage Vitals [06/17/25 1445]   Temperature Heart Rate Respirations BP   36.7 °C (98.1 °F) 87 20 130/72      Pulse Ox Temp Source Heart Rate Source Patient Position   97 % Temporal Monitor Sitting      BP Location FiO2 (%)     Right arm --       Physical Exam  Constitutional:       General: She is not in acute distress.  HENT:      Nose: Congestion present.      Mouth/Throat:      Mouth: Mucous membranes are moist.      Pharynx: Oropharynx is clear. No oropharyngeal exudate or posterior oropharyngeal erythema.   Eyes:      Extraocular Movements: Extraocular movements intact.      Conjunctiva/sclera: Conjunctivae normal.      Pupils: Pupils are equal, round, and reactive to light.   Cardiovascular:      Rate and Rhythm: Normal rate and regular rhythm.      Pulses: Normal pulses.      Heart sounds: Normal heart sounds.      Comments: No peripheral edema.  Pulmonary:      Effort: Pulmonary effort is normal.      Breath sounds: Normal breath sounds.   Musculoskeletal:      Comments: Normal gait.  No calf tenderness or swelling bilaterally.   Skin:     General: Skin is warm.    Neurological:      Mental Status: She is alert.      Comments: Speech normal         ED Course & MDM   Diagnoses as of 25 7769   Upper respiratory tract infection, unspecified type                 No data recorded     [place] Coma Scale Score: 15 (25 1444 : Richard Oliva RN)                           Medical Decision Making  25-year-old female presenting to the ER today with sinus congestion runny nose sore throat and cough that started yesterday.  Her sore throat has resolved but her other symptoms has persisted.  She denies any further complaints and arrives afebrile with stable vital signs.  She is resting without signs of acute distress.  Heart RRR, lungs are clear.  Posterior oropharynx that erythema or edema or exudate uvula is midline.  Exam is otherwise within normal limits.  COVID and flu test are ordered as well as chest x-ray.    She is negative for COVID and flu.  Chest x-ray per my interpretation does not show any acute infiltrate, effusion or pneumothorax.  On repeat assessment patient is resting comfortably.  I did discuss results with her today, diagnosis and treatment plan home-going.  I did discuss the need to monitor symptoms and follow-up with her doctor and I outlined warning signs to return to the ED and she expressed understanding and agreed with the plan of care today.        Procedure  Procedures       [1] History reviewed. No pertinent past medical history.  [2]   Past Surgical History:  Procedure Laterality Date     SECTION, LOW TRANSVERSE     [3] No family history on file.  [4]   Social History  Tobacco Use    Smoking status: Every Day     Current packs/day: 0.10     Average packs/day: 0.1 packs/day for 5.0 years (0.5 ttl pk-yrs)     Types: Cigarettes, Cigars    Smokeless tobacco: Never   Vaping Use    Vaping status: Never Used   Substance Use Topics    Alcohol use: Not on file    Drug use: Never        Brionna Hay PA-C  25 [id number]

## 2025-08-17 ENCOUNTER — APPOINTMENT (OUTPATIENT)
Dept: CT IMAGING | Age: 25
End: 2025-08-17

## 2025-08-17 ENCOUNTER — HOSPITAL ENCOUNTER (EMERGENCY)
Age: 25
Discharge: HOME | End: 2025-08-17
Attending: EMERGENCY MEDICINE

## 2025-08-17 VITALS
WEIGHT: 175 LBS | BODY MASS INDEX: 31.01 KG/M2 | TEMPERATURE: 97.9 F | HEART RATE: 68 BPM | DIASTOLIC BLOOD PRESSURE: 68 MMHG | OXYGEN SATURATION: 99 % | HEIGHT: 63 IN | SYSTOLIC BLOOD PRESSURE: 114 MMHG | RESPIRATION RATE: 16 BRPM

## 2025-08-17 DIAGNOSIS — R07.81 PAIN IN RIB: Primary | ICD-10-CM

## 2025-08-17 LAB
AMORPH SED URNS QL MICRO: ABNORMAL
BACTERIA URNS QL MICRO: ABNORMAL /HPF
BILIRUB UR QL STRIP: NEGATIVE
CLARITY UR: CLEAR
COLOR UR: YELLOW
EPI CELLS #/AREA URNS HPF: ABNORMAL /HPF
GLUCOSE UR STRIP-MCNC: NEGATIVE MG/DL
HCG UR QL: NEGATIVE
HGB UR QL STRIP: NORMAL
KETONES UR STRIP-MCNC: NORMAL MG/DL
LEUKOCYTE ESTERASE UR QL STRIP: NORMAL
NITRITE UR QL STRIP: NEGATIVE
PH UR STRIP: 6.5 [PH] (ref 5–9)
PROT UR STRIP-MCNC: NEGATIVE MG/DL
RBC #/AREA URNS HPF: ABNORMAL /HPF (ref 0–2)
SP GR UR STRIP: >=1.03 (ref 1–1.03)
URINE REFLEX TO CULTURE: NORMAL
UROBILINOGEN UR STRIP-ACNC: 1 E.U./DL
WBC #/AREA URNS HPF: ABNORMAL /HPF (ref 0–5)

## 2025-08-17 PROCEDURE — 99284 EMERGENCY DEPT VISIT MOD MDM: CPT

## 2025-08-17 PROCEDURE — 6370000000 HC RX 637 (ALT 250 FOR IP): Performed by: EMERGENCY MEDICINE

## 2025-08-17 PROCEDURE — 81001 URINALYSIS AUTO W/SCOPE: CPT

## 2025-08-17 PROCEDURE — 84703 CHORIONIC GONADOTROPIN ASSAY: CPT

## 2025-08-17 PROCEDURE — 71250 CT THORAX DX C-: CPT

## 2025-08-17 RX ORDER — TRAMADOL HYDROCHLORIDE 50 MG/1
50 TABLET ORAL ONCE
Status: COMPLETED | OUTPATIENT
Start: 2025-08-17 | End: 2025-08-17

## 2025-08-17 RX ORDER — SERTRALINE HYDROCHLORIDE 100 MG/1
100 TABLET, FILM COATED ORAL DAILY
COMMUNITY

## 2025-08-17 RX ORDER — ONDANSETRON 4 MG/1
4 TABLET, ORALLY DISINTEGRATING ORAL ONCE
Status: DISCONTINUED | OUTPATIENT
Start: 2025-08-17 | End: 2025-08-17

## 2025-08-17 RX ORDER — HYDROMORPHONE HYDROCHLORIDE 1 MG/ML
1 INJECTION, SOLUTION INTRAMUSCULAR; INTRAVENOUS; SUBCUTANEOUS ONCE
Status: DISCONTINUED | OUTPATIENT
Start: 2025-08-17 | End: 2025-08-17

## 2025-08-17 RX ADMIN — TRAMADOL HYDROCHLORIDE 50 MG: 50 TABLET, COATED ORAL at 05:42

## 2025-08-17 ASSESSMENT — PAIN DESCRIPTION - ORIENTATION
ORIENTATION: LEFT
ORIENTATION: LEFT

## 2025-08-17 ASSESSMENT — PAIN DESCRIPTION - LOCATION
LOCATION: RIB CAGE
LOCATION: RIB CAGE

## 2025-08-17 ASSESSMENT — PAIN - FUNCTIONAL ASSESSMENT
PAIN_FUNCTIONAL_ASSESSMENT: 0-10
PAIN_FUNCTIONAL_ASSESSMENT: 0-10

## 2025-08-17 ASSESSMENT — PAIN SCALES - GENERAL
PAINLEVEL_OUTOF10: 10
PAINLEVEL_OUTOF10: 7

## 2025-08-17 ASSESSMENT — LIFESTYLE VARIABLES
HOW OFTEN DO YOU HAVE A DRINK CONTAINING ALCOHOL: MONTHLY OR LESS
HOW MANY STANDARD DRINKS CONTAINING ALCOHOL DO YOU HAVE ON A TYPICAL DAY: 1 OR 2

## 2025-08-17 ASSESSMENT — PAIN DESCRIPTION - DESCRIPTORS: DESCRIPTORS: SHARP

## 2025-09-02 ENCOUNTER — PROCEDURE VISIT (OUTPATIENT)
Dept: OBSTETRICS AND GYNECOLOGY | Facility: CLINIC | Age: 25
End: 2025-09-02
Payer: COMMERCIAL

## 2025-09-02 VITALS
HEIGHT: 62 IN | BODY MASS INDEX: 32.57 KG/M2 | DIASTOLIC BLOOD PRESSURE: 62 MMHG | SYSTOLIC BLOOD PRESSURE: 114 MMHG | WEIGHT: 177 LBS

## 2025-09-02 DIAGNOSIS — Z30.017 NEXPLANON INSERTION: ICD-10-CM

## 2025-09-02 LAB — PREGNANCY TEST URINE, POC: NEGATIVE

## 2025-09-02 PROCEDURE — 81025 URINE PREGNANCY TEST: CPT

## 2025-09-02 PROCEDURE — 11981 INSERTION DRUG DLVR IMPLANT: CPT

## 2025-09-02 PROCEDURE — 99213 OFFICE O/P EST LOW 20 MIN: CPT

## 2025-09-08 ENCOUNTER — APPOINTMENT (OUTPATIENT)
Dept: OBSTETRICS AND GYNECOLOGY | Facility: CLINIC | Age: 25
End: 2025-09-08
Payer: COMMERCIAL